# Patient Record
Sex: FEMALE | Race: WHITE | NOT HISPANIC OR LATINO
[De-identification: names, ages, dates, MRNs, and addresses within clinical notes are randomized per-mention and may not be internally consistent; named-entity substitution may affect disease eponyms.]

---

## 2019-07-03 PROBLEM — Z00.00 ENCOUNTER FOR PREVENTIVE HEALTH EXAMINATION: Status: ACTIVE | Noted: 2019-07-03

## 2019-08-02 ENCOUNTER — APPOINTMENT (OUTPATIENT)
Dept: BARIATRICS | Facility: CLINIC | Age: 74
End: 2019-08-02
Payer: MEDICARE

## 2019-08-02 VITALS
SYSTOLIC BLOOD PRESSURE: 121 MMHG | HEART RATE: 75 BPM | DIASTOLIC BLOOD PRESSURE: 74 MMHG | BODY MASS INDEX: 34.27 KG/M2 | WEIGHT: 170 LBS | HEIGHT: 59 IN

## 2019-08-02 DIAGNOSIS — Z87.39 PERSONAL HISTORY OF OTHER DISEASES OF THE MUSCULOSKELETAL SYSTEM AND CONNECTIVE TISSUE: ICD-10-CM

## 2019-08-02 DIAGNOSIS — G47.33 OBSTRUCTIVE SLEEP APNEA (ADULT) (PEDIATRIC): ICD-10-CM

## 2019-08-02 DIAGNOSIS — Z78.9 OTHER SPECIFIED HEALTH STATUS: ICD-10-CM

## 2019-08-02 DIAGNOSIS — I10 ESSENTIAL (PRIMARY) HYPERTENSION: ICD-10-CM

## 2019-08-02 DIAGNOSIS — E78.5 HYPERLIPIDEMIA, UNSPECIFIED: ICD-10-CM

## 2019-08-02 PROCEDURE — 99204 OFFICE O/P NEW MOD 45 MIN: CPT

## 2019-08-02 RX ORDER — ACETAMINOPHEN 500 MG/1
500 TABLET ORAL
Refills: 0 | Status: ACTIVE | COMMUNITY

## 2019-08-02 RX ORDER — OMEPRAZOLE 20 MG/1
20 CAPSULE, DELAYED RELEASE ORAL
Refills: 0 | Status: ACTIVE | COMMUNITY

## 2019-08-02 RX ORDER — CETIRIZINE HCL 10 MG
10 TABLET ORAL
Refills: 0 | Status: ACTIVE | COMMUNITY

## 2019-08-02 RX ORDER — LOSARTAN POTASSIUM AND HYDROCHLOROTHIAZIDE 100; 12.5 MG/1; MG/1
TABLET, FILM COATED ORAL
Refills: 0 | Status: ACTIVE | COMMUNITY

## 2019-08-02 RX ORDER — KETOTIFEN FUMARATE 0.25 MG/ML
SOLUTION OPHTHALMIC
Refills: 0 | Status: ACTIVE | COMMUNITY

## 2019-08-02 RX ORDER — ROSUVASTATIN CALCIUM 20 MG/1
20 TABLET, FILM COATED ORAL
Refills: 0 | Status: ACTIVE | COMMUNITY

## 2019-08-02 NOTE — ASSESSMENT
[FreeTextEntry1] : Recent lab work reviewed\par Will refer to RD- keep food record.  Discussed carbohydrate portion sizes\par Continue exercise regimen\par Discussed possibility of steroids leading to weight gain\par Would consider bariatric surgery if BMI increases over 35\par Will have HbA1C retested by PMD in a few months- if elevated and renal function WNL may consider metformin.  May also consider Topiramate off label in the future

## 2019-08-02 NOTE — HISTORY OF PRESENT ILLNESS
[FreeTextEntry1] : 74 year old female for medical weight loss consultation.  Patient is with her  who also is having a consultation today.  Recently diagnosed with PMR and starting round 2 of methylpredisone 4mg TID which has caused her to gain weight over the past week.  Son is also struggling with weight.  \par Started to gain weight in high school\par Tried WW in the past, [facility] \par Lowest adult weight 129\par Highest adult weight 176\par Works with a  2x a week, then does 1/2 hour to 45 minutes walking other days\par Adequate water intake\par Sleep pattern- has some anxiety which keeps her awake at times\par Food recall- B- oatmeal or bagel, L- yogurt D- tuna salad, S-cheese, jello

## 2019-09-12 ENCOUNTER — APPOINTMENT (OUTPATIENT)
Dept: BARIATRICS | Facility: CLINIC | Age: 74
End: 2019-09-12
Payer: MEDICARE

## 2019-09-12 VITALS
HEART RATE: 67 BPM | WEIGHT: 169.8 LBS | DIASTOLIC BLOOD PRESSURE: 77 MMHG | BODY MASS INDEX: 34.3 KG/M2 | SYSTOLIC BLOOD PRESSURE: 120 MMHG

## 2019-09-12 PROCEDURE — 99214 OFFICE O/P EST MOD 30 MIN: CPT

## 2019-09-12 NOTE — HISTORY OF PRESENT ILLNESS
[FreeTextEntry1] : 74 year old female for medical weight loss consultation.  Patient is with her  who also is having a consultation today.  Recently diagnosed with PMR and starting round 2 of methylpredisone 4mg TID which has caused her to gain weight over the past week.  Son is also struggling with weight.  \par Started to gain weight in high school\par Tried WW in the past, [facility] \par Lowest adult weight 129\par Highest adult weight 176\par Works with a  2x a week, then does 1/2 hour to 45 minutes walking other days\par Adequate water intake\par Sleep pattern- has some anxiety which keeps her awake at times\par Food recall- B- oatmeal or bagel, L- yogurt D- tuna salad, S-cheese, jello \par \par 9/12/19- Patient RTO feeling well.  Weight stable from last visit, continuing to take methylprednisone goes to back to rheumatologist next week.  Hoping to cut down dose at that time.  Has been walking daily, trying new healthy recipes.  B- either skips or has eggs, oatmeal

## 2019-09-12 NOTE — ASSESSMENT
[FreeTextEntry1] : Patient to f/u about having steroids reduced or tapered\par F/U with RD in 1 week, continue healthy recipes, discussed appropriate portion sizes\par Continue exercise program- walking daily \par Referred for [employer] tours at [employer]  I will SWITCH the dose or number of times a day I take the medications listed below when I get home from the hospital:  None

## 2019-09-12 NOTE — REASON FOR VISIT
[Follow-Up Visit] : a follow-up visit for [Hyperlipidemia] : hyperlipidemia [Hypertension] : hypertension [Obesity] : obesity [Obstructive Sleep Apena] : obstructive sleep apena

## 2019-10-11 ENCOUNTER — APPOINTMENT (OUTPATIENT)
Dept: BARIATRICS | Facility: CLINIC | Age: 74
End: 2019-10-11
Payer: MEDICARE

## 2019-10-11 VITALS
HEIGHT: 59 IN | WEIGHT: 168 LBS | HEART RATE: 68 BPM | SYSTOLIC BLOOD PRESSURE: 125 MMHG | DIASTOLIC BLOOD PRESSURE: 71 MMHG | BODY MASS INDEX: 33.87 KG/M2

## 2019-10-11 DIAGNOSIS — E66.9 OBESITY, UNSPECIFIED: ICD-10-CM

## 2019-10-11 PROCEDURE — 99213 OFFICE O/P EST LOW 20 MIN: CPT

## 2019-10-11 NOTE — HISTORY OF PRESENT ILLNESS
[FreeTextEntry1] : 74 year old female for medical weight loss consultation.  Patient is with her  who also is having a consultation today.  Recently diagnosed with PMR and starting round 2 of methylpredisone 4mg TID which has caused her to gain weight over the past week.  Son is also struggling with weight.  \par Started to gain weight in high school\par Tried WW in the past, [facility] \par Lowest adult weight 129\par Highest adult weight 176\par Works with a  2x a week, then does 1/2 hour to 45 minutes walking other days\par Adequate water intake\par Sleep pattern- has some anxiety which keeps her awake at times\par Food recall- B- oatmeal or bagel, L- yogurt D- tuna salad, S-cheese, jello \par \par 9/12/19- Patient RTO feeling well.  Weight stable from last visit, continuing to take methylprednisone goes to back to rheumatologist next week.  Hoping to cut down dose at that time.  Has been walking daily, trying new healthy recipes.  B- either skips or has eggs, oatmeal \par \par 10/11/19- Patient RTO feeling well.  Had strained her left arm at the gym and now going to PT.  Not currently in any pain.  Has formed a group at the gym who shares healthy recipes.  Went on the [employer] tour but previous appointment with QUYEN was bumped.  Off the methylprednisone this week.  Feels change in her appetite.

## 2019-10-11 NOTE — ASSESSMENT
[FreeTextEntry1] : I will refer patient to Monica/Char to get sooner RD appointment\par Patient will keep food dairy prior to appointment\par Continue exercise regimen\par Off steroids I expect patient to start seeing weight improvement with her new healthy habits\par RTO 1 month

## 2019-11-15 ENCOUNTER — APPOINTMENT (OUTPATIENT)
Dept: BARIATRICS | Facility: CLINIC | Age: 74
End: 2019-11-15

## 2020-01-03 ENCOUNTER — APPOINTMENT (OUTPATIENT)
Dept: BARIATRICS | Facility: CLINIC | Age: 75
End: 2020-01-03

## 2022-07-11 ENCOUNTER — TRANSCRIPTION ENCOUNTER (OUTPATIENT)
Age: 77
End: 2022-07-11

## 2023-09-29 ENCOUNTER — APPOINTMENT (OUTPATIENT)
Dept: PAIN MANAGEMENT | Facility: CLINIC | Age: 78
End: 2023-09-29
Payer: MEDICARE

## 2023-09-29 DIAGNOSIS — G89.29 CERVICALGIA: ICD-10-CM

## 2023-09-29 DIAGNOSIS — M48.02 SPINAL STENOSIS, CERVICAL REGION: ICD-10-CM

## 2023-09-29 DIAGNOSIS — M70.72 OTHER BURSITIS OF HIP, LEFT HIP: ICD-10-CM

## 2023-09-29 DIAGNOSIS — M47.812 SPONDYLOSIS W/OUT MYELOPATHY OR RADICULOPATHY, CERVICAL REGION: ICD-10-CM

## 2023-09-29 DIAGNOSIS — M54.2 CERVICALGIA: ICD-10-CM

## 2023-09-29 DIAGNOSIS — Z86.79 PERSONAL HISTORY OF OTHER DISEASES OF THE CIRCULATORY SYSTEM: ICD-10-CM

## 2023-09-29 DIAGNOSIS — E78.49 OTHER HYPERLIPIDEMIA: ICD-10-CM

## 2023-09-29 PROCEDURE — 99202 OFFICE O/P NEW SF 15 MIN: CPT

## 2023-09-29 RX ORDER — FLUTICASONE PROPIONATE AND SALMETEROL 50; 100 UG/1; UG/1
100-50 POWDER RESPIRATORY (INHALATION)
Refills: 0 | Status: ACTIVE | COMMUNITY

## 2023-09-29 RX ORDER — OMEPRAZOLE 20 MG/1
TABLET, DELAYED RELEASE ORAL
Refills: 0 | Status: ACTIVE | COMMUNITY

## 2023-09-29 RX ORDER — MONTELUKAST 10 MG/1
10 TABLET, FILM COATED ORAL
Refills: 0 | Status: ACTIVE | COMMUNITY

## 2023-09-29 RX ORDER — ALENDRONATE SODIUM 70 MG/75ML
70 SOLUTION ORAL
Refills: 0 | Status: ACTIVE | COMMUNITY

## 2023-09-29 RX ORDER — BENZONATATE 200 MG/1
200 CAPSULE ORAL
Refills: 0 | Status: ACTIVE | COMMUNITY

## 2023-09-29 RX ORDER — VALSARTAN 80 MG/1
80 TABLET, COATED ORAL
Refills: 0 | Status: ACTIVE | COMMUNITY

## 2023-09-29 RX ORDER — ROSUVASTATIN CALCIUM 5 MG/1
TABLET, FILM COATED ORAL
Refills: 0 | Status: ACTIVE | COMMUNITY

## 2023-09-29 RX ORDER — FLUTICASONE FUROATE AND VILANTEROL TRIFENATATE 100; 25 UG/1; UG/1
100-25 POWDER RESPIRATORY (INHALATION)
Refills: 0 | Status: ACTIVE | COMMUNITY

## 2023-10-19 ENCOUNTER — APPOINTMENT (OUTPATIENT)
Dept: PAIN MANAGEMENT | Facility: CLINIC | Age: 78
End: 2023-10-19

## 2024-09-06 ENCOUNTER — NON-APPOINTMENT (OUTPATIENT)
Age: 79
End: 2024-09-06